# Patient Record
Sex: FEMALE | Race: WHITE | NOT HISPANIC OR LATINO | Employment: STUDENT | URBAN - METROPOLITAN AREA
[De-identification: names, ages, dates, MRNs, and addresses within clinical notes are randomized per-mention and may not be internally consistent; named-entity substitution may affect disease eponyms.]

---

## 2017-06-14 ENCOUNTER — OFFICE VISIT (OUTPATIENT)
Dept: INTERNAL MEDICINE | Facility: CLINIC | Age: 22
End: 2017-06-14
Payer: COMMERCIAL

## 2017-06-14 VITALS
BODY MASS INDEX: 20.36 KG/M2 | SYSTOLIC BLOOD PRESSURE: 116 MMHG | TEMPERATURE: 97 F | HEIGHT: 63 IN | WEIGHT: 114.88 LBS | DIASTOLIC BLOOD PRESSURE: 78 MMHG

## 2017-06-14 DIAGNOSIS — G40.909 SEIZURE DISORDER: Primary | ICD-10-CM

## 2017-06-14 DIAGNOSIS — F41.9 ANXIETY AND DEPRESSION: ICD-10-CM

## 2017-06-14 DIAGNOSIS — F32.A ANXIETY AND DEPRESSION: ICD-10-CM

## 2017-06-14 PROCEDURE — 99999 PR PBB SHADOW E&M-NEW PATIENT-LVL III: CPT | Mod: PBBFAC,,, | Performed by: FAMILY MEDICINE

## 2017-06-14 PROCEDURE — 99204 OFFICE O/P NEW MOD 45 MIN: CPT | Mod: S$GLB,,, | Performed by: FAMILY MEDICINE

## 2017-06-14 RX ORDER — OXCARBAZEPINE 600 MG/1
600 TABLET, FILM COATED ORAL 2 TIMES DAILY
Qty: 60 TABLET | Refills: 3 | Status: SHIPPED | OUTPATIENT
Start: 2017-06-14 | End: 2017-08-02 | Stop reason: SDUPTHER

## 2017-06-14 RX ORDER — OXCARBAZEPINE 600 MG/1
150 TABLET, FILM COATED ORAL 2 TIMES DAILY
COMMUNITY
End: 2017-06-14 | Stop reason: SDUPTHER

## 2017-06-14 RX ORDER — BUPROPION HYDROCHLORIDE 150 MG/1
150 TABLET ORAL DAILY
Qty: 30 TABLET | Refills: 3 | Status: SHIPPED | OUTPATIENT
Start: 2017-06-14 | End: 2017-06-23

## 2017-06-14 RX ORDER — SERTRALINE HYDROCHLORIDE 100 MG/1
100 TABLET, FILM COATED ORAL 2 TIMES DAILY
COMMUNITY
End: 2017-06-14 | Stop reason: SDUPTHER

## 2017-06-14 RX ORDER — SERTRALINE HYDROCHLORIDE 100 MG/1
100 TABLET, FILM COATED ORAL 2 TIMES DAILY
Qty: 60 TABLET | Refills: 3 | Status: SHIPPED | OUTPATIENT
Start: 2017-06-14 | End: 2017-09-05 | Stop reason: SDUPTHER

## 2017-06-14 RX ORDER — CLONAZEPAM 1 MG/1
1 TABLET ORAL 2 TIMES DAILY
COMMUNITY
End: 2017-06-14

## 2017-06-14 NOTE — PROGRESS NOTES
Subjective:   Patient ID: Baylee Stacy is a 22 y.o. female.  Chief Complaint:  Establish Care and Medication Refill      Presents to establish care.  Previously managed by Dr. Dick Vargas.  Medical history seizure disorder and mood disorder.  No recent seizure activity.  Stable on Trileptal.  Attends college at [employer].  Labs September at [facility] normal per patient.  Mood stable.  On high-dose Zoloft and low-dose Wellbutrin.  Somewhat not as good as in past.  Questioning possible change to Cymbalta.      Mental Health Problem   The primary symptoms include dysphoric mood and negative symptoms. The primary symptoms do not include delusions, hallucinations, bizarre behavior, disorganized speech or somatic symptoms. The current episode started more than 1 month ago. This is a chronic problem.   The mood has been resolved since its onset.   The negative symptoms appear to have been resolved since their onset.   Additional symptoms of the illness include anhedonia, fatigue, attention impairment and seizures. Additional symptoms of the illness do not include no insomnia, no hypersomnia, no appetite change, no unexpected weight change, no agitation, no psychomotor retardation, no feelings of worthlessness, no euphoric mood, no increased goal-directed activity, no flight of ideas, no inflated self-esteem, no decreased need for sleep, not distractible, no poor judgment, no visual change, no headaches or no abdominal pain. She does not admit to suicidal ideas. She does not have a plan to commit suicide. She does not contemplate harming herself.     Review of Systems   Constitutional: Positive for fatigue. Negative for appetite change, chills, fever and unexpected weight change.   Respiratory: Negative for chest tightness and shortness of breath.    Cardiovascular: Negative for chest pain and palpitations.   Gastrointestinal: Negative for abdominal pain, diarrhea, nausea and vomiting.  "  Genitourinary: Negative for pelvic pain.   Musculoskeletal: Negative for back pain, myalgias and neck pain.   Skin: Negative for rash.   Neurological: Positive for seizures. Negative for dizziness, tremors, weakness, numbness and headaches.   Psychiatric/Behavioral: Positive for dysphoric mood. Negative for agitation, behavioral problems, confusion, decreased concentration, hallucinations, self-injury, sleep disturbance and suicidal ideas. The patient is not nervous/anxious, does not have insomnia and is not hyperactive.        Current Outpatient Prescriptions:     oxcarbazepine (TRILEPTAL) 600 MG Tab, Take 1 tablet (600 mg total) by mouth 2 (two) times daily., Disp: 60 tablet, Rfl: 3    sertraline (ZOLOFT) 100 MG tablet, Take 1 tablet (100 mg total) by mouth 2 (two) times daily., Disp: 60 tablet, Rfl: 3    buPROPion (WELLBUTRIN XL) 150 MG TB24 tablet, Take 1 tablet (150 mg total) by mouth once daily., Disp: 30 tablet, Rfl: 3    Objective:   /78 (BP Location: Right arm, Patient Position: Sitting, BP Method: Manual)   Temp 96.9 °F (36.1 °C) (Tympanic)   Ht 5' 2.5" (1.588 m)   Wt 52.1 kg (114 lb 13.8 oz)   LMP 06/05/2017 (Approximate)   BMI 20.67 kg/m²     Physical Exam   Constitutional: She is oriented to person, place, and time. Vital signs are normal. She appears well-developed and well-nourished. She is cooperative. No distress.   Cardiovascular: Normal rate, regular rhythm and normal heart sounds.    Pulmonary/Chest: Effort normal and breath sounds normal.   Abdominal: Soft. She exhibits no distension. There is no tenderness. There is no rebound, no guarding and no CVA tenderness.   Neurological: She is alert and oriented to person, place, and time. She displays a negative Romberg sign. Coordination and gait normal.   Skin: No rash noted.   Psychiatric: She has a normal mood and affect. Her speech is normal and behavior is normal. Judgment and thought content normal. Her mood appears not anxious. " Her affect is not angry, not blunt, not labile and not inappropriate. She is not agitated, not aggressive, not hyperactive, not slowed, not withdrawn, not actively hallucinating and not combative. Thought content is not paranoid and not delusional. Cognition and memory are normal. She does not exhibit a depressed mood. She expresses no homicidal and no suicidal ideation. She is attentive.   Nursing note and vitals reviewed.    Assessment:     1. Seizure disorder    2. Anxiety and depression      Plan:   Seizure disorder  -     oxcarbazepine (TRILEPTAL) 600 MG Tab; Take 1 tablet (600 mg total) by mouth 2 (two) times daily.  Dispense: 60 tablet; Refill: 3  Seizure-free.  Continue Trileptal.    Anxiety and depression  -     sertraline (ZOLOFT) 100 MG tablet; Take 1 tablet (100 mg total) by mouth 2 (two) times daily.  Dispense: 60 tablet; Refill: 3  -     buPROPion (WELLBUTRIN XL) 150 MG TB24 tablet; Take 1 tablet (150 mg total) by mouth once daily.  Dispense: 30 tablet; Refill: 3  Discussed options to help improve mood.  Advised to not change to Cymbalta, but continue Zoloft present dose and increase Wellbutrin dose.  Reassess in 1 month.    Patient expresses understanding and agrees to above plan.  Return to clinic 1 month.

## 2017-06-20 ENCOUNTER — HOSPITAL ENCOUNTER (OUTPATIENT)
Facility: HOSPITAL | Age: 22
Discharge: HOME | End: 2017-06-20
Attending: EMERGENCY MEDICINE
Payer: COMMERCIAL

## 2017-06-20 VITALS
HEIGHT: 63 IN | DIASTOLIC BLOOD PRESSURE: 56 MMHG | HEART RATE: 106 BPM | RESPIRATION RATE: 20 BRPM | SYSTOLIC BLOOD PRESSURE: 109 MMHG | TEMPERATURE: 98 F | WEIGHT: 118 LBS | BODY MASS INDEX: 20.91 KG/M2 | OXYGEN SATURATION: 97 %

## 2017-06-20 DIAGNOSIS — Z79.899 HIGH RISK MEDICATIONS (NOT ANTICOAGULANTS) LONG-TERM USE: ICD-10-CM

## 2017-06-20 DIAGNOSIS — G40.909 SEIZURE DISORDER: Primary | Chronic | ICD-10-CM

## 2017-06-20 DIAGNOSIS — R56.9 POSTICTAL STATE: ICD-10-CM

## 2017-06-20 DIAGNOSIS — G40.909 SEIZURE DISORDER: Primary | ICD-10-CM

## 2017-06-20 DIAGNOSIS — E87.20 METABOLIC ACIDOSIS: ICD-10-CM

## 2017-06-20 LAB
ALBUMIN SERPL BCP-MCNC: 3.6 G/DL
ALBUMIN SERPL BCP-MCNC: 4.5 G/DL
ALP SERPL-CCNC: 57 U/L
ALP SERPL-CCNC: 69 U/L
ALT SERPL W/O P-5'-P-CCNC: 17 U/L
ALT SERPL W/O P-5'-P-CCNC: 23 U/L
AMPHET+METHAMPHET UR QL: NEGATIVE
ANION GAP SERPL CALC-SCNC: 10 MMOL/L
ANION GAP SERPL CALC-SCNC: 24 MMOL/L
AST SERPL-CCNC: 22 U/L
AST SERPL-CCNC: 29 U/L
B-HCG UR QL: NEGATIVE
BACTERIA #/AREA URNS HPF: ABNORMAL /HPF
BARBITURATES UR QL SCN>200 NG/ML: NEGATIVE
BASOPHILS # BLD AUTO: 0.03 K/UL
BASOPHILS NFR BLD: 0.2 %
BENZODIAZ UR QL SCN>200 NG/ML: NEGATIVE
BILIRUB SERPL-MCNC: 0.3 MG/DL
BILIRUB SERPL-MCNC: 0.4 MG/DL
BILIRUB UR QL STRIP: NEGATIVE
BUN SERPL-MCNC: 7 MG/DL
BUN SERPL-MCNC: 9 MG/DL
BZE UR QL SCN: NEGATIVE
CALCIUM SERPL-MCNC: 8 MG/DL
CALCIUM SERPL-MCNC: 9.6 MG/DL
CANNABINOIDS UR QL SCN: NEGATIVE
CHLORIDE SERPL-SCNC: 104 MMOL/L
CHLORIDE SERPL-SCNC: 110 MMOL/L
CLARITY UR: CLEAR
CO2 SERPL-SCNC: 14 MMOL/L
CO2 SERPL-SCNC: 8 MMOL/L
COLOR UR: YELLOW
CREAT SERPL-MCNC: 0.7 MG/DL
CREAT SERPL-MCNC: 0.8 MG/DL
CREAT UR-MCNC: 69.2 MG/DL
DIFFERENTIAL METHOD: ABNORMAL
EOSINOPHIL # BLD AUTO: 0 K/UL
EOSINOPHIL NFR BLD: 0 %
ERYTHROCYTE [DISTWIDTH] IN BLOOD BY AUTOMATED COUNT: 12.1 %
EST. GFR  (AFRICAN AMERICAN): >60 ML/MIN/1.73 M^2
EST. GFR  (AFRICAN AMERICAN): >60 ML/MIN/1.73 M^2
EST. GFR  (NON AFRICAN AMERICAN): >60 ML/MIN/1.73 M^2
EST. GFR  (NON AFRICAN AMERICAN): >60 ML/MIN/1.73 M^2
ETHANOL SERPL-MCNC: <10 MG/DL
GLUCOSE SERPL-MCNC: 121 MG/DL
GLUCOSE SERPL-MCNC: 80 MG/DL
GLUCOSE UR QL STRIP: NEGATIVE
HCT VFR BLD AUTO: 43.4 %
HGB BLD-MCNC: 15 G/DL
HGB UR QL STRIP: ABNORMAL
KETONES UR QL STRIP: NEGATIVE
LACTATE SERPL-SCNC: 0.6 MMOL/L
LEUKOCYTE ESTERASE UR QL STRIP: NEGATIVE
LYMPHOCYTES # BLD AUTO: 2.8 K/UL
LYMPHOCYTES NFR BLD: 20.6 %
MAGNESIUM SERPL-MCNC: 2.8 MG/DL
MCH RBC QN AUTO: 30.6 PG
MCHC RBC AUTO-ENTMCNC: 34.6 %
MCV RBC AUTO: 89 FL
METHADONE UR QL SCN>300 NG/ML: NEGATIVE
MICROSCOPIC COMMENT: ABNORMAL
MONOCYTES # BLD AUTO: 1.1 K/UL
MONOCYTES NFR BLD: 8 %
NEUTROPHILS # BLD AUTO: 9.6 K/UL
NEUTROPHILS NFR BLD: 71.4 %
NITRITE UR QL STRIP: NEGATIVE
OPIATES UR QL SCN: NEGATIVE
PCP UR QL SCN>25 NG/ML: NEGATIVE
PH UR STRIP: 7 [PH] (ref 5–8)
PLATELET # BLD AUTO: 209 K/UL
PMV BLD AUTO: 10.8 FL
POTASSIUM SERPL-SCNC: 3.6 MMOL/L
POTASSIUM SERPL-SCNC: 4.2 MMOL/L
PROT SERPL-MCNC: 6.3 G/DL
PROT SERPL-MCNC: 8.1 G/DL
PROT UR QL STRIP: ABNORMAL
RBC # BLD AUTO: 4.9 M/UL
RBC #/AREA URNS HPF: 20 /HPF (ref 0–4)
SODIUM SERPL-SCNC: 134 MMOL/L
SODIUM SERPL-SCNC: 136 MMOL/L
SP GR UR STRIP: 1.02 (ref 1–1.03)
TOXICOLOGY INFORMATION: NORMAL
URN SPEC COLLECT METH UR: ABNORMAL
UROBILINOGEN UR STRIP-ACNC: NEGATIVE EU/DL
WBC # BLD AUTO: 13.41 K/UL

## 2017-06-20 PROCEDURE — 83735 ASSAY OF MAGNESIUM: CPT

## 2017-06-20 PROCEDURE — 81000 URINALYSIS NONAUTO W/SCOPE: CPT

## 2017-06-20 PROCEDURE — 80053 COMPREHEN METABOLIC PANEL: CPT

## 2017-06-20 PROCEDURE — 81025 URINE PREGNANCY TEST: CPT

## 2017-06-20 PROCEDURE — 80320 DRUG SCREEN QUANTALCOHOLS: CPT

## 2017-06-20 PROCEDURE — 85025 COMPLETE CBC W/AUTO DIFF WBC: CPT

## 2017-06-20 PROCEDURE — 36415 COLL VENOUS BLD VENIPUNCTURE: CPT

## 2017-06-20 PROCEDURE — 63600175 PHARM REV CODE 636 W HCPCS: Performed by: EMERGENCY MEDICINE

## 2017-06-20 PROCEDURE — 96361 HYDRATE IV INFUSION ADD-ON: CPT

## 2017-06-20 PROCEDURE — G0378 HOSPITAL OBSERVATION PER HR: HCPCS

## 2017-06-20 PROCEDURE — 96374 THER/PROPH/DIAG INJ IV PUSH: CPT

## 2017-06-20 PROCEDURE — 83605 ASSAY OF LACTIC ACID: CPT

## 2017-06-20 PROCEDURE — 25000003 PHARM REV CODE 250: Performed by: EMERGENCY MEDICINE

## 2017-06-20 PROCEDURE — 99284 EMERGENCY DEPT VISIT MOD MDM: CPT | Mod: 25

## 2017-06-20 PROCEDURE — 80307 DRUG TEST PRSMV CHEM ANLYZR: CPT

## 2017-06-20 RX ORDER — LORAZEPAM 2 MG/ML
1 INJECTION INTRAMUSCULAR
Status: COMPLETED | OUTPATIENT
Start: 2017-06-20 | End: 2017-06-20

## 2017-06-20 RX ORDER — ACETAMINOPHEN 325 MG/1
650 TABLET ORAL
Status: COMPLETED | OUTPATIENT
Start: 2017-06-20 | End: 2017-06-20

## 2017-06-20 RX ORDER — OXCARBAZEPINE 150 MG/1
600 TABLET, FILM COATED ORAL 2 TIMES DAILY
Status: DISCONTINUED | OUTPATIENT
Start: 2017-06-20 | End: 2017-06-20 | Stop reason: HOSPADM

## 2017-06-20 RX ADMIN — SODIUM CHLORIDE 1000 ML: 0.9 INJECTION, SOLUTION INTRAVENOUS at 04:06

## 2017-06-20 RX ADMIN — SODIUM CHLORIDE 1000 ML: 0.9 INJECTION, SOLUTION INTRAVENOUS at 03:06

## 2017-06-20 RX ADMIN — ACETAMINOPHEN 650 MG: 325 TABLET ORAL at 06:06

## 2017-06-20 RX ADMIN — LORAZEPAM 1 MG: 2 INJECTION INTRAMUSCULAR; INTRAVENOUS at 02:06

## 2017-06-20 RX ADMIN — OXCARBAZEPINE 600 MG: 150 TABLET ORAL at 06:06

## 2017-06-20 NOTE — ED NOTES
Witnessed tonic clonic seizure x 1 minute.  Dr. Fraser at bedside, pt responsive and VSS at this time.

## 2017-06-23 ENCOUNTER — OFFICE VISIT (OUTPATIENT)
Dept: INTERNAL MEDICINE | Facility: CLINIC | Age: 22
End: 2017-06-23
Payer: COMMERCIAL

## 2017-06-23 VITALS
HEIGHT: 63 IN | TEMPERATURE: 97 F | WEIGHT: 118.19 LBS | BODY MASS INDEX: 20.94 KG/M2 | SYSTOLIC BLOOD PRESSURE: 122 MMHG | DIASTOLIC BLOOD PRESSURE: 70 MMHG

## 2017-06-23 DIAGNOSIS — G40.909 SEIZURE DISORDER: Primary | Chronic | ICD-10-CM

## 2017-06-23 DIAGNOSIS — F41.9 ANXIETY AND DEPRESSION: Chronic | ICD-10-CM

## 2017-06-23 DIAGNOSIS — F32.A ANXIETY AND DEPRESSION: Chronic | ICD-10-CM

## 2017-06-23 PROCEDURE — 99999 PR PBB SHADOW E&M-EST. PATIENT-LVL III: CPT | Mod: PBBFAC,,, | Performed by: FAMILY MEDICINE

## 2017-06-23 PROCEDURE — 99214 OFFICE O/P EST MOD 30 MIN: CPT | Mod: S$GLB,,, | Performed by: FAMILY MEDICINE

## 2017-06-23 RX ORDER — BUPROPION HYDROCHLORIDE 150 MG/1
150 TABLET, EXTENDED RELEASE ORAL ONCE
COMMUNITY
End: 2017-08-02

## 2017-06-23 NOTE — PROGRESS NOTES
Subjective:   Patient ID: Baylee Stacy is a 22 y.o. female.  Chief Complaint:  Follow-up and Seizures      Presents for ER follow-up.  Last visit continued on Zoloft 100 mg twice a day, advised change to Wellbutrin  mg daily from Wellbutrin  mg twice a day, to try and improve mood.  Patient did not start higher dose, on Wellbutrin  mg twice a day  History seizure disorder.  On Trileptal.  Compliant with medication.  Denied any other drug use.  Earlier this week with recurrent seizure ×2.  Went to ER for evaluation.  Additional seizure in ER.  Given Ativan.  Lab testing done with no significant abnormalities other than changes related to her seizure. Trileptal level pending  Observed for 6 hours with return to baseline and no additional seizure activity.  Discussed with neurology who advised DC home with continuation of present Trileptal dose.  Has done well since discharge.  No recurrent seizures.  Has follow-up with neurology scheduled.      Review of Systems   Constitutional: Negative for activity change, appetite change and fatigue.   Eyes: Negative for visual disturbance.   Respiratory: Negative for chest tightness and shortness of breath.    Cardiovascular: Negative for chest pain and palpitations.   Gastrointestinal: Negative for abdominal pain, diarrhea, nausea and vomiting.   Genitourinary: Negative for pelvic pain.   Musculoskeletal: Negative for back pain, myalgias and neck pain.   Skin: Negative for rash.   Neurological: Negative for dizziness, tremors, weakness, light-headedness, numbness and headaches.   Psychiatric/Behavioral: Negative for agitation, behavioral problems, confusion, decreased concentration, dysphoric mood, hallucinations, self-injury, sleep disturbance and suicidal ideas. The patient is not nervous/anxious and is not hyperactive.        Current Outpatient Prescriptions:     buPROPion (WELLBUTRIN SR) 100 MG TBSR 12 hr tablet, Take 100 mg by mouth 2 (two) times  "daily., Disp: , Rfl:     oxcarbazepine (TRILEPTAL) 600 MG Tab, Take 1 tablet (600 mg total) by mouth 2 (two) times daily. (Patient taking differently: Take 1,200 mg by mouth 2 (two) times daily. ), Disp: 60 tablet, Rfl: 3    sertraline (ZOLOFT) 100 MG tablet, Take 1 tablet (100 mg total) by mouth 2 (two) times daily., Disp: 60 tablet, Rfl: 3    Objective:   /70 (BP Location: Right arm, Patient Position: Sitting, BP Method: Manual)   Temp 97.2 °F (36.2 °C) (Tympanic)   Ht 5' 3" (1.6 m)   Wt 53.6 kg (118 lb 2.7 oz)   LMP 06/05/2017 (Approximate)   BMI 20.93 kg/m²     Physical Exam   Constitutional: She is oriented to person, place, and time. Vital signs are normal. She appears well-developed and well-nourished. She is cooperative. No distress.   Cardiovascular: Normal rate, regular rhythm and normal heart sounds.    Pulmonary/Chest: Effort normal and breath sounds normal.   Abdominal: Soft. She exhibits no distension. There is no tenderness. There is no rebound, no guarding and no CVA tenderness.   Musculoskeletal: She exhibits no edema.   Neurological: She is alert and oriented to person, place, and time. She displays a negative Romberg sign. Coordination and gait normal.   Skin: No rash noted.   Psychiatric: She has a normal mood and affect. Her speech is normal and behavior is normal. Judgment and thought content normal. Her mood appears not anxious. Her affect is not angry, not blunt, not labile and not inappropriate. She is not agitated, not aggressive, not hyperactive, not slowed, not withdrawn, not actively hallucinating and not combative. Thought content is not paranoid and not delusional. Cognition and memory are normal. She does not exhibit a depressed mood. She expresses no homicidal and no suicidal ideation. She is attentive.   Nursing note and vitals reviewed.    Assessment:     1. Seizure disorder    2. Anxiety and depression      Plan:   No seizures since Tuesday.  Overall mood " stable.  Continue Trileptal present dose. Level pending.  Continue Zoloft and Wellbutrin SR resident doses.  Change to Wellbutrin  mg for slightly higher dose if okay with neurology.  Follow-up neurology as scheduled.  Follow-up with me in 6 weeks after changing to  Wellbutrin XL if OKd by Neurology

## 2017-06-23 NOTE — PROGRESS NOTES
"Subjective:   Patient ID: Baylee Stacy is a 22 y.o. female.  Chief Complaint:  Follow-up and Seizures    HPI  Review of Systems    Current Outpatient Prescriptions:     buPROPion (WELLBUTRIN SR) 100 MG TBSR 12 hr tablet, Take 100 mg by mouth 2 (two) times daily., Disp: , Rfl:     oxcarbazepine (TRILEPTAL) 600 MG Tab, Take 1 tablet (600 mg total) by mouth 2 (two) times daily. (Patient taking differently: Take 1,200 mg by mouth 2 (two) times daily. ), Disp: 60 tablet, Rfl: 3    sertraline (ZOLOFT) 100 MG tablet, Take 1 tablet (100 mg total) by mouth 2 (two) times daily., Disp: 60 tablet, Rfl: 3    Objective:   /70 (BP Location: Right arm, Patient Position: Sitting, BP Method: Manual)   Temp 97.2 °F (36.2 °C) (Tympanic)   Ht 5' 3" (1.6 m)   Wt 53.6 kg (118 lb 2.7 oz)   LMP 06/05/2017 (Approximate)   BMI 20.93 kg/m²     Physical Exam  Assessment:     1. Seizure disorder    2. Anxiety and depression      Plan:   Seizure disorder    Anxiety and depression      "

## 2017-06-28 ENCOUNTER — OFFICE VISIT (OUTPATIENT)
Dept: NEUROLOGY | Facility: CLINIC | Age: 22
End: 2017-06-28
Payer: COMMERCIAL

## 2017-06-28 ENCOUNTER — TELEPHONE (OUTPATIENT)
Dept: NEUROLOGY | Facility: CLINIC | Age: 22
End: 2017-06-28

## 2017-06-28 VITALS
WEIGHT: 114.63 LBS | HEART RATE: 60 BPM | BODY MASS INDEX: 20.31 KG/M2 | SYSTOLIC BLOOD PRESSURE: 126 MMHG | DIASTOLIC BLOOD PRESSURE: 80 MMHG | HEIGHT: 63 IN

## 2017-06-28 DIAGNOSIS — G40.909 SEIZURE DISORDER: Primary | Chronic | ICD-10-CM

## 2017-06-28 PROCEDURE — 99204 OFFICE O/P NEW MOD 45 MIN: CPT | Mod: S$GLB,,, | Performed by: PSYCHIATRY & NEUROLOGY

## 2017-06-28 PROCEDURE — 99999 PR PBB SHADOW E&M-EST. PATIENT-LVL III: CPT | Mod: PBBFAC,,, | Performed by: PSYCHIATRY & NEUROLOGY

## 2017-06-28 NOTE — TELEPHONE ENCOUNTER
Called pt to let her know the labs from the Hospital were not drawn for the Oxcarbazepine, and that  stated that if she has another Seizure she will have to come here to have it drawn. Pt verbally understood.

## 2017-06-28 NOTE — PROGRESS NOTES
This is a 22-year-old left-handed patient who had recently presented to the emergency room with a series of 3 seizures that occurred and one day about one week ago.  The patient actually has a prior established history of a seizure disorder with the first seizure occurring around the age of 4.  Etiology of the seizure was presumed to be due to the fact that the patient was premature as an infant.  She took medications to approximately the age of 13 and then was off medication for a number of years before she had another seizure.    The patient's current seizure began as a staring spell.  This lasted several moments and then resolved without loss of motor tone or involuntary movement.  She was seen to have a second seizure as she was walking into an urgent care to have lab tests done.  Her mother noted her to be stumbling with her head turned to the left and eyes to the left.  The patient however does not recall these events.  Because of those 2 events, she was directed to the emergency room but in the emergency room she had another brief episode of unresponsiveness with a slight involuntary movement.  As the event was being witnessed by the emergency room physician, she received lorazepam IV which promptly terminated this seizure.  In the emergency room, she had a CT scan of the brain done which was normal.  A Trileptal level was ordered but has not yet been returned.    Following those 3 events, the patient was tired and slept the remainder of the day.  She did have a slight headache afterwards also.  The patient's mother indicates that the events as described are fairly typical for the patient.  Her previous seizure before this event was over 2 years ago.  Prior to that, she had not had a seizure for several years.    Her prior seizure workup had included EEG, 24 hour EEG with video monitoring, CAT scan of the brain, an MRI of the brain.  All of these studies have been reported as being normal.  Previous  medications were carbamazepine.  She is currently on oxcarbazepine 600 mg twice a day      ROS:  GENERAL: No fever, chills, fatigability or weight loss.  SKIN: No rashes, itching or changes in color or texture of skin.  HEAD: No headaches or recent head trauma.  EYES: Visual acuity fine. No photophobia, ocular pain or diplopia.  EARS: Denies ear pain, discharge or vertigo.  NOSE: No loss of smell, no epistaxis or postnasal drip.  MOUTH & THROAT: No hoarseness or change in voice. No excessive gum bleeding.  NODES: Denies swollen glands.  CHEST: Denies NARAYAN, cyanosis, wheezing, cough and sputum production.  CARDIOVASCULAR: Denies chest pain, PND, orthopnea or reduced exercise tolerance.  ABDOMEN: Appetite fine. No weight loss. Denies diarrhea, abdominal pain, hematemesis or blood in stool.  URINARY: No flank pain, dysuria or hematuria.  MUSCULOSKELETAL: No joint stiffness or swelling. Denies back pain.  NEUROLOGIC: No history of paralysis, alteration of gait or coordination.    PAST HISTORY:  Surgery: No surgical procedures  Medical: History of seizure, history of anxiety  ALLERGIES: Gabapentin    FAMILY HISTORY:  The patient's sister also has a history of a seizure disorder.  However no other family members are known to have seizure or epilepsy.  Both parents are in good health.    SOCIAL HISTORY:  The patient is a senior in college majoring in sociology.  She is a nonsmoker.  She is single.    PE:   VITAL SIGNS: Blood pressure 126/80, pulse 76, weight 52 kg, height 5 foot 3 inches, BMI 20.31  APPEARANCE: Well nourished, well developed, in no acute distress.    HEAD: Normocephalic, atraumatic.  EYES: PERRL. EOMI.  Non-icteric sclerae.    EARS: TM's intact. Light reflex normal. No retraction or perforation.    NOSE: Mucosa pink. Airway clear.  MOUTH & THROAT: No tonsillar enlargement. No pharyngeal erythema or exudate. No stridor.  NECK: Supple. No bruits.  CHEST: Lungs clear to auscultation.  CARDIOVASCULAR: Regular  rhythm without significant murmurs.  ABDOMEN: Bowel sounds normal. Not distended.   MUSCULOSKELETAL:  No bony deformity seen.  Muscle tone and muscle mass are normal in both upper and both lower extremities.  NEUROLOGIC:   Mental Status:  The patient is well oriented to person, time, place, and situation.  The patient is attentive to the environment and cooperative for the exam.  Cranial Nerves: II-XII grossly intact.  Visual acuity aided is 20/25 left eye, right eye, and both eyes.  The patient perceives the color red to be the same shade of red with each eye.  Visual fields are intact by confrontation.  Fundoscopic exam is normal.  No hemorrhage, exudate or papilledema is present. The extraocular muscles are intact in the cardinal directions of gaze.  No ptosis is present. Facial features are symmetrical.  Speech is normal in fluency, diction, and phrasing.  Tongue protrudes in the midline.    Gait and Station:  Romberg is negative.  Good alternate armswing with normal gait.  No ataxia as the patient is able to stand tandem.  Motor:  No downdrift of either arm when held at shoulder level.  Manual muscle testing of proximal and distal muscles of both upper and lower extremities is normal.   Sensory:  Intact both upper and lower extremities to pin prick, touch, and vibration.  Cerebellar:  Finger to nose done well.  Alternating movements intact.  No involuntary movements or tremor seen.  Reflexes:  Stretch reflexes are 2+ both upper and lower extremities.  Plantar stimulation is flexor bilaterally and no pathological reflexes are seen     ASSESSMENT:  1.  Epilepsy, localization related seizure with secondary generalization    RECOMMENDATIONS:  1.  Continue Trileptal 600 mg twice a day.  We will need to query the lab regarding the oxcarbazepine level that was ordered over 1 week ago.  2.  If no further seizure, no change in medication is recommended.  Depending upon results of blood level, further recommendations  could be made if the patient were to continue having frequent and repeated seizures.  3.  Routine follow-up in 6 months or sooner if needed.       This note is generated with speech recognition software and is subject to transcription error and sound alike phrases that may be missed by proofreading.

## 2017-07-19 ENCOUNTER — PATIENT OUTREACH (OUTPATIENT)
Dept: ADMINISTRATIVE | Facility: HOSPITAL | Age: 22
End: 2017-07-19

## 2017-08-02 ENCOUNTER — OFFICE VISIT (OUTPATIENT)
Dept: INTERNAL MEDICINE | Facility: CLINIC | Age: 22
End: 2017-08-02
Payer: COMMERCIAL

## 2017-08-02 ENCOUNTER — PATIENT MESSAGE (OUTPATIENT)
Dept: INTERNAL MEDICINE | Facility: CLINIC | Age: 22
End: 2017-08-02

## 2017-08-02 VITALS
WEIGHT: 117.94 LBS | BODY MASS INDEX: 20.9 KG/M2 | DIASTOLIC BLOOD PRESSURE: 73 MMHG | HEIGHT: 63 IN | TEMPERATURE: 96 F | SYSTOLIC BLOOD PRESSURE: 104 MMHG

## 2017-08-02 DIAGNOSIS — F32.A ANXIETY AND DEPRESSION: Primary | Chronic | ICD-10-CM

## 2017-08-02 DIAGNOSIS — F41.9 ANXIETY AND DEPRESSION: Primary | Chronic | ICD-10-CM

## 2017-08-02 DIAGNOSIS — G40.909 SEIZURE DISORDER: ICD-10-CM

## 2017-08-02 PROCEDURE — 99999 PR PBB SHADOW E&M-EST. PATIENT-LVL III: CPT | Mod: PBBFAC,,, | Performed by: FAMILY MEDICINE

## 2017-08-02 PROCEDURE — 99214 OFFICE O/P EST MOD 30 MIN: CPT | Mod: S$GLB,,, | Performed by: FAMILY MEDICINE

## 2017-08-02 RX ORDER — BUPROPION HYDROCHLORIDE 300 MG/1
300 TABLET ORAL DAILY
Qty: 30 TABLET | Refills: 3 | Status: SHIPPED | OUTPATIENT
Start: 2017-08-02 | End: 2017-09-05 | Stop reason: SDUPTHER

## 2017-08-02 RX ORDER — OXCARBAZEPINE 600 MG/1
600 TABLET, FILM COATED ORAL 2 TIMES DAILY
Qty: 60 TABLET | Refills: 3 | Status: SHIPPED | OUTPATIENT
Start: 2017-08-02 | End: 2017-09-05 | Stop reason: SDUPTHER

## 2017-08-02 NOTE — PROGRESS NOTES
"Subjective:   Patient ID: Baylee Stacy is a 22 y.o. female.  Chief Complaint:  Follow-up      1 month follow up on anxiety and depression  On Zoloft 100 mg BID.  Increased Wellbutrin  mg daily for last 30 days. No side effects. No overall improvement in mood.  Remains seizure free on Trileptal 600 mg BID  No new concerns or issues today      Review of Systems   Constitutional: Negative for activity change, appetite change and fatigue.   Eyes: Negative for visual disturbance.   Respiratory: Negative for chest tightness and shortness of breath.    Cardiovascular: Negative for chest pain and palpitations.   Gastrointestinal: Negative for abdominal pain, diarrhea, nausea and vomiting.   Genitourinary: Negative for pelvic pain.   Musculoskeletal: Negative for back pain, myalgias and neck pain.   Skin: Negative for rash.   Neurological: Negative for dizziness, tremors, seizures, syncope, facial asymmetry, speech difficulty, weakness, light-headedness, numbness and headaches.   Psychiatric/Behavioral: Positive for decreased concentration and dysphoric mood. Negative for agitation, behavioral problems, confusion, hallucinations, self-injury, sleep disturbance and suicidal ideas. The patient is not nervous/anxious and is not hyperactive.        Current Outpatient Prescriptions:     oxcarbazepine (TRILEPTAL) 600 MG Tab, Take 1 tablet (600 mg total) by mouth 2 (two) times daily., Disp: 60 tablet, Rfl: 3    sertraline (ZOLOFT) 100 MG tablet, Take 1 tablet (100 mg total) by mouth 2 (two) times daily., Disp: 60 tablet, Rfl: 3    buPROPion (WELLBUTRIN XL) 300 MG 24 hr tablet, Take 1 tablet (300 mg total) by mouth once daily., Disp: 30 tablet, Rfl: 3    Objective:   /73 (BP Location: Right arm, Patient Position: Sitting, BP Method: Manual)   Temp 96.3 °F (35.7 °C) (Tympanic)   Ht 5' 3" (1.6 m)   Wt 53.5 kg (117 lb 15.1 oz)   LMP 07/29/2017   BMI 20.89 kg/m²     Physical Exam   Constitutional: She is " oriented to person, place, and time. Vital signs are normal. She appears well-developed and well-nourished. She is cooperative. No distress.   Cardiovascular: Normal rate, regular rhythm and normal heart sounds.    Pulmonary/Chest: Effort normal and breath sounds normal.   Abdominal: Soft. She exhibits no distension. There is no tenderness. There is no rebound, no guarding and no CVA tenderness.   Neurological: She is alert and oriented to person, place, and time. She displays a negative Romberg sign. Coordination and gait normal.   Skin: No rash noted.   Psychiatric: She has a normal mood and affect. Judgment and thought content normal. Her mood appears not anxious. Her affect is not angry, not blunt, not labile and not inappropriate. Her speech is not rapid and/or pressured, not delayed, not tangential and not slurred. She is slowed and withdrawn. She is not agitated, not aggressive, not hyperactive, not actively hallucinating and not combative. Thought content is not paranoid and not delusional. Cognition and memory are normal. She does not exhibit a depressed mood. She expresses no homicidal and no suicidal ideation. She is communicative. She is attentive.   Nursing note and vitals reviewed.    Assessment:     1. Anxiety and depression    2. Seizure disorder      Plan:   Anxiety and depression  -     buPROPion (WELLBUTRIN XL) 300 MG 24 hr tablet; Take 1 tablet (300 mg total) by mouth once daily.  Dispense: 30 tablet; Refill: 3  Increase dose Wellbutrin  mg daily.  Continue Zoloft 100 mg twice a day.    Seizure disorder  -     oxcarbazepine (TRILEPTAL) 600 MG Tab; Take 1 tablet (600 mg total) by mouth 2 (two) times daily.  Dispense: 60 tablet; Refill: 3  Stable.  Continued Trileptal 600 mg twice a day.    Follow-up with M.D. in 3 months.

## 2017-08-28 ENCOUNTER — PATIENT MESSAGE (OUTPATIENT)
Dept: INTERNAL MEDICINE | Facility: CLINIC | Age: 22
End: 2017-08-28

## 2017-09-05 ENCOUNTER — PATIENT MESSAGE (OUTPATIENT)
Dept: INTERNAL MEDICINE | Facility: CLINIC | Age: 22
End: 2017-09-05

## 2017-09-05 DIAGNOSIS — G40.909 SEIZURE DISORDER: ICD-10-CM

## 2017-09-05 DIAGNOSIS — F32.A ANXIETY AND DEPRESSION: Chronic | ICD-10-CM

## 2017-09-05 DIAGNOSIS — F41.9 ANXIETY AND DEPRESSION: Chronic | ICD-10-CM

## 2017-09-05 RX ORDER — BUPROPION HYDROCHLORIDE 300 MG/1
300 TABLET ORAL DAILY
Qty: 30 TABLET | Refills: 3 | Status: SHIPPED | OUTPATIENT
Start: 2017-09-05 | End: 2018-09-05

## 2017-09-05 RX ORDER — OXCARBAZEPINE 600 MG/1
600 TABLET, FILM COATED ORAL 2 TIMES DAILY
Qty: 60 TABLET | Refills: 3 | Status: SHIPPED | OUTPATIENT
Start: 2017-09-05

## 2017-09-05 RX ORDER — SERTRALINE HYDROCHLORIDE 100 MG/1
100 TABLET, FILM COATED ORAL 2 TIMES DAILY
Qty: 60 TABLET | Refills: 3 | Status: SHIPPED | OUTPATIENT
Start: 2017-09-05

## 2017-10-19 ENCOUNTER — PATIENT MESSAGE (OUTPATIENT)
Dept: INTERNAL MEDICINE | Facility: CLINIC | Age: 22
End: 2017-10-19

## 2017-10-19 ENCOUNTER — PATIENT MESSAGE (OUTPATIENT)
Dept: NEUROLOGY | Facility: CLINIC | Age: 22
End: 2017-10-19

## 2017-10-19 ENCOUNTER — PATIENT OUTREACH (OUTPATIENT)
Dept: ADMINISTRATIVE | Facility: HOSPITAL | Age: 22
End: 2017-10-19

## 2017-10-19 DIAGNOSIS — G40.802 EPILEPSY WITH BOTH GENERALIZED AND FOCAL FEATURES: Primary | ICD-10-CM

## 2017-10-20 RX ORDER — LACOSAMIDE 50 MG/1
50 TABLET ORAL EVERY 12 HOURS
Qty: 60 TABLET | Refills: 11 | Status: SHIPPED | OUTPATIENT
Start: 2017-10-20 | End: 2018-10-20

## 2017-11-24 ENCOUNTER — OFFICE VISIT (OUTPATIENT)
Dept: OPHTHALMOLOGY | Facility: CLINIC | Age: 22
End: 2017-11-24
Payer: COMMERCIAL

## 2017-11-24 DIAGNOSIS — Z01.00 ENCOUNTER FOR ROUTINE EYE AND VISION EXAMINATION: Primary | ICD-10-CM

## 2017-11-24 PROCEDURE — 99999 PR PBB SHADOW E&M-EST. PATIENT-LVL II: CPT | Mod: PBBFAC,,, | Performed by: OPHTHALMOLOGY

## 2017-11-24 PROCEDURE — 92004 COMPRE OPH EXAM NEW PT 1/>: CPT | Mod: S$GLB,,, | Performed by: OPHTHALMOLOGY

## 2017-11-24 NOTE — PROGRESS NOTES
===============================  11/28/2017   Baylee Stacy,   22 y.o. female   Last visit [facility]: :Visit date not found   Last visit eye dept. Visit date not found  [facility]:  Corrected distance visual acuity was 20/25 in the right eye and 20/20 in the left eye.  Tonometry     Tonometry (Applanation, 8:45 AM)       Right Left    Pressure 12 12              Wearing Rx     Wearing Rx       Sphere Cylinder Axis    Right -4.25 +0.75 010    Left -2.50 +0.50 175    Type:  SVL              Manifest Refraction     Manifest Refraction       Sphere Cylinder [place] [facility]    Right -4.50 +0.75 010 20/20    Left -2.75 +0.50 175 20/20              Chief Complaint   Patient presents with    Concerns About Ocular Health     new pt to dr. mead/  It has been about 3 years since she has changed her glasses        HPI     Concerns About Ocular Health    Additional comments: new pt to dr. mead/  It has been about 3 years   since she has changed her glasses           Comments   No dm  No sx'       Last edited by Stacey Marie on 11/24/2017  8:34 AM. (History)          ________________  11/24/2017  Problem List Items Addressed This Visit     None      Visit Diagnoses     Encounter for routine eye and vision examination    -  Primary        rtc with optometry 1 year  .       ===========================